# Patient Record
(demographics unavailable — no encounter records)

---

## 2017-05-12 NOTE — RAD
Indication hematuria. Urinary frequency and incontinence.



Multiphase imaging through the abdomen and pelvis was performed. Initially a

noncontrast examination of the abdomen and pelvis was performed. Subsequently

portal venous phase imaging through the abdomen was performed and finally

delayed images through the abdomen and pelvis (CT urogram). MIP images were

generated and reviewed. 60 cc of Omnipaque 300 was administered intravenously.



On the initial noncontrast images the adrenal glands appear unremarkable. No

renal calculi are seen. There is no hydronephrosis on either side. There is no

hydroureter or calcification seen along the course of either ureter. The

portal venous phase images are essentially unremarkable. No renal mass is

seen. Several parapelvic cysts are seen associated with both kidneys. The

delayed images show no additional finding. Parapelvic cysts are again seen.

The ureters appear normal. The bladder is incompletely distended but grossly

normal.



The lung bases are clear. The liver and spleen appear unremarkable. No

pancreatic abnormality is seen. No acute finding is seen in the abdomen. No

acute or significant finding is seen in the pelvis. There are degenerative

changes in the lumbar spine. Postoperative changes with associated mesh are

seen involving the ventral abdominal wall.



IMPRESSION: No acute finding seen in the abdomen or pelvis.

                          Parapelvic cysts otherwise essentially unremarkable

kidneys.

                          Unremarkable ureters.

                          Incompletely distended urinary bladder. No gross

abnormality seen











[employer] Compliance Statement:



One or more of the following individualized dose reduction techniques were

utilized for this examination:

1. Automated exposure control

2. Adjustment of the mA and/or kV according to patient size

3. Use of iterative reconstruction technique